# Patient Record
Sex: FEMALE | URBAN - METROPOLITAN AREA
[De-identification: names, ages, dates, MRNs, and addresses within clinical notes are randomized per-mention and may not be internally consistent; named-entity substitution may affect disease eponyms.]

---

## 2017-05-10 ENCOUNTER — IMPORTED ENCOUNTER (OUTPATIENT)
Dept: URBAN - METROPOLITAN AREA CLINIC 9 | Facility: CLINIC | Age: 67
End: 2017-05-10

## 2017-05-18 NOTE — PATIENT DISCUSSION
An examination that was significantly and separately identifiable from the procedure performed today was also completed for this retinal vein occlusion. No signs of neovascularization or worsening retinal ischemia, either or which unrecognized or untreated can be a significant source of significant vision loss, was seen today. There is no evidence of disease progression requiring any additional intervention for this condition today. Will continue to monitor closely.

## 2017-05-22 NOTE — PATIENT DISCUSSION
Recommended observation until #1 stable, edu can NOT make [facility] approve cat surgery here, that is up to the [facility].

## 2017-05-22 NOTE — PATIENT DISCUSSION
Rec Avastin injection x N/A.  This will require Auth. from the [facility] so will have to wait untill received to [address] [id number].

## 2017-05-22 NOTE — PATIENT DISCUSSION
Worse today, now Edema present. Beto Farooq was rec at last exam because was not active/No edema was present.  Still main reason for low VA is cataract but rec hold off till active CVO with ME is stable.

## 2017-06-14 NOTE — PATIENT DISCUSSION
Worse today, now Edema present. Todd Galarza was rec at last exam because was not active/No edema was present.  Still main reason for low VA is cataract but rec hold off till active CVO with ME is stable.

## 2017-06-14 NOTE — PROCEDURE NOTE: CLINICAL
PROCEDURE NOTE: Avastin () OD. Diagnosis: Central Retinal Vein Occlusion with Macular Edema. Prior to the original injection, risks/benefits/alternatives discussed including infection, loss of vision, hemorrhage, cataract, glaucoma, retinal tears or detachment. A written consent is on file, and the need for today’s injection was discussed and the patient is understanding and wishes to proceed. The off-label status of Intravitreal Avastin also was reviewed. The patient wished to proceed with treatment. The patient wished to proceed with treatment. Topical anesthetic drops were applied to the eye. Betadine prep was performed. Surgical mask worn. Sterile drape and lid speculum were applied. Using the syringe provided, Avastin 1.25 mg in 0.05 cc was injected into the vitreous cavity. Injection site: 3-4 mm from the limbus. Patient tolerated procedure well. Following the intravitreal injection, the sterile lid speculum was removed. CRA perfusion confirmed. CF vision checked. Patient given office phone number/answering service number and advised to call immediately should there be an increase in floaters or redness, loss of vision or pain, or should they have any other questions or concerns. Parmjit Rendon

## 2017-06-14 NOTE — PATIENT DISCUSSION
Recommended observation until #1 stable, edu can NOT make [facility] approve cat surgery here, that is up to the [facility].

## 2017-07-14 NOTE — PATIENT DISCUSSION
Recommended observation until #1 stable for at least a couple months, edu can NOT make [facility] approve cat surgery here, that is up to the [facility].

## 2017-09-22 NOTE — PATIENT DISCUSSION
Recommended observation until #1 stable for at least a couple months, edu can NOT make [facility] approve cat surgery here, that is up to the [facility].

## 2017-10-04 NOTE — PROCEDURE NOTE: CLINICAL
PROCEDURE NOTE: Avastin () #2 OD. Diagnosis: Central Retinal Vein Occlusion with Macular Edema. Prep: Betadine Drops and Betadine Scrub. Prior to the original injection, risks/benefits/alternatives discussed including infection, loss of vision, hemorrhage, cataract, glaucoma, retinal tears or detachment. A written consent is on file, and the need for today’s injection was discussed and the patient is understanding and wishes to proceed. The off-label status of Intravitreal Avastin also was reviewed. The patient wished to proceed with treatment. The patient wished to proceed with treatment. Topical anesthetic drops were applied to the eye. Betadine prep was performed. Surgical mask worn. Sterile drape and lid speculum were applied. Using the syringe provided, Avastin 1.25 mg in 0.05 cc was injected into the vitreous cavity. Injection site: 3-4 mm from the limbus. Patient tolerated procedure well. Following the intravitreal injection, the sterile lid speculum was removed. CRA perfusion confirmed. CF vision checked. Patient given office phone number/answering service number and advised to call immediately should there be an increase in floaters or redness, loss of vision or pain, or should they have any other questions or concerns. Bereket Cash

## 2017-10-04 NOTE — PATIENT DISCUSSION
The injection was given and tolerated well by patient. Post-injection instructions were reviewed and understood by the patient.

## 2017-10-04 NOTE — PATIENT DISCUSSION
Recommended observation until #1 stable for at least a couple months, edu can NOT make [facility] approve cat surgery here, that is up to the [facility].

## 2018-01-19 NOTE — PROCEDURE NOTE: CLINICAL
PROCEDURE NOTE: Eylea Sample #1 OD. Diagnosis: Central Retinal Vein Occlusion with Macular Edema. Anesthesia: Topical. Prep: Betadine Drops and Betadine Scrub. Prior to injection, risks/benefits/alternatives discussed including corneal abrasion, infection, loss of vision, hemorrhage, cataract, glaucoma, retinal tears or detachment. A written consent is on file, and the need for today’s injection was discussed and the patient is understanding and wishes to proceed. The entire vial of Eylea was drawn up into a syringe. The opened vial, remaining drug, and filtered needle were disposed of in a certified biohazard container. Betadine prep was performed. Topical anesthesia was induced with Alcaine. 4% lidocaine pledge. A lid speculum was used. A short 30g needle on a 1cc syringe was used with product that that had previously been prepared under sterile conditions. Injection site: 3-4 mm from the limbus. The used syringe/needle was transferred to a biohazard container. Lid speculum removed. Mask worn during procedure. Patient tolerated procedure well. Count fingers vision was verified. There were no complications. Patient was given the standard instruction sheet. Patient given office phone number/answering service number and advised to call immediately should there be loss of vision or pain, or should they have any other questions or concerns. Miah Sauer

## 2018-01-19 NOTE — PATIENT DISCUSSION
Increase edema seen on OCT and exam today.  Recommend Eylea injection today.  Will use a sample due to pt's insurance.

## 2018-01-19 NOTE — PATIENT DISCUSSION
Recommend pt have a vision check with DPM on 1/23/18 to make sure vision OD is better after injection for pt to proceed with surgery OS.

## 2018-01-19 NOTE — PATIENT DISCUSSION
Eylea #1 injection.  Injection was administered without complication.  Post-injection instructions were reviewed and understood by pt.

## 2018-01-29 NOTE — PATIENT DISCUSSION
CE\IOL schedules with [facility] 1/31/[address] Trip Trujillo.

## 2018-01-31 NOTE — PATIENT DISCUSSION
CE\IOL schedules with [facility] 1/31/[address] cleared [address] Pamela Montes.

## 2018-03-07 NOTE — PROCEDURE NOTE: CLINICAL
PROCEDURE NOTE: Eylea #2 OD. Diagnosis: Central Retinal Vein Occlusion with Macular Edema. Prior to injection, risks/benefits/alternatives discussed including corneal abrasion, infection, loss of vision, hemorrhage, cataract, glaucoma, retinal tears or detachment. A written consent is on file, and the need for today’s injection was discussed and the patient is understanding and wishes to proceed. The entire vial of Eylea was drawn up into a syringe. The opened vial, remaining drug, and filtered needle were disposed of in a certified biohazard container. Betadine prep was performed. Topical anesthesia was induced with Alcaine. 4% lidocaine pledge. A lid speculum was used. A short 30g needle on a 1cc syringe was used with product that that had previously been prepared under sterile conditions. Injection site: 3-4 mm from the limbus. The used syringe/needle was transferred to a biohazard container. Lid speculum removed. Mask worn during procedure. Patient tolerated procedure well. Count fingers vision was verified. There were no complications. Patient was given the standard instruction sheet. Patient given office phone number/answering service number and advised to call immediately should there be loss of vision or pain, or should they have any other questions or concerns. [facility]

## 2018-03-07 NOTE — PATIENT DISCUSSION
Pt edu edema worse. Only treatment is with another injection of Eylea. Will hopefully get some center vision back, may have to have multiple injections and may require future injections due to edema does have tendency to return. Pt elects to proceed with Eylea today.

## 2018-03-22 NOTE — PATIENT DISCUSSION
Eylea #2 injection.  Injection was administered without complication.  Post-injection instructions were reviewed and understood by pt. Will follow up in 4 weeks for re-evaluation. Advised pt to call with any other vision changes.

## 2018-04-05 NOTE — PATIENT DISCUSSION
Pt edu swelling slightly better but still persistent. Ozurdex vs Eylea discussed. [employer] of Ozurdex explained in detail. Pt elects to proceed. Will schedule Ozurdex in 2 weeks to allow time for auth from [address] Advised pt to call with any vision changes.

## 2018-04-18 NOTE — PROCEDURE NOTE: CLINICAL
PROCEDURE NOTE: Intravitreal Ozurdex #1 OD. Diagnosis: Central Retinal Vein Occlusion with Macular Edema. Anesthesia: Subconjunctival. Prep: Betadine Drops and Betadine Scrub. Prior to injection, risks/benefits/alternatives discussed including infection, loss of vision, hemorrhage, cataract, glaucoma, retinal tears or detachment and patient wished to proceed. After topical anesthesia, betadine prep was performed. After the Betadine prep, intravitreal injection of Ozurdex 0.7mg was given 3-4 mm from the limbus in the inferotemporal quadrant. A lid speculum was used. Intravitreal injection of Ozurdex 0.7mg was given. Injection site: 3-4 mm from the limbus. Lid speculum removed. Count fingers vision was verified. Patient tolerated procedure well. There were no complications. Count fingers or better vision was verified within 5 minutes of the intravitreal injection prior to the patient leaving the office. CRA perfusion confirmed. Post-op instructions given. Patient given office phone number/answering service number and advised to call immediately should there be an increase in floaters or redness, loss of vision or pain, or should they have any other questions or concerns. Dedra Ayala

## 2018-04-18 NOTE — PATIENT DISCUSSION
Ozurdex # 1 recommended today after discussion of benefits, risks and alternatives. The injection was given and tolerated well by patient. Post-injection instructions were reviewed and understood by the patient.

## 2018-07-20 NOTE — PATIENT DISCUSSION
Pt edu edema slightly worse but vision is improving. Would recommend Ozurdex # 2 in 4 weeks. Pt elects to proceed.

## 2018-08-08 NOTE — PATIENT DISCUSSION
Pt edu edema has worsened .  Recommend Ozurdex #2 today.  Pt elects to proceed.  Recommend Ozurdex every 3 months. The injection as administered without complication.  Post-injection instructions were reviewed and understood by pt. Pt advised to keep head elevated at night.

## 2018-08-08 NOTE — PROCEDURE NOTE: CLINICAL
PROCEDURE NOTE: Intravitreal Ozurdex #2 OD. Anesthesia: Subconjunctival. Prep: Betadine Drops and Betadine Scrub. Prior to injection, risks/benefits/alternatives discussed including infection, loss of vision, hemorrhage, cataract, glaucoma, retinal tears or detachment and patient wished to proceed. After topical anesthesia, betadine prep was performed. After the Betadine prep, intravitreal injection of Ozurdex 0.7mg was given 3-4 mm from the limbus in the inferotemporal quadrant. A lid speculum was used. Intravitreal injection of Ozurdex 0.7mg was given. Injection site: 3-4 mm from the limbus. Lid speculum removed. Count fingers vision was verified. Patient tolerated procedure well. There were no complications. Count fingers or better vision was verified within 5 minutes of the intravitreal injection prior to the patient leaving the office. CRA perfusion confirmed. Post-op instructions given. Patient given office phone number/answering service number and advised to call immediately should there be an increase in floaters or redness, loss of vision or pain, or should they have any other questions or concerns. Elo Vigil

## 2018-10-04 NOTE — PATIENT DISCUSSION
swelling improved good response to Ozurdex.  rec give Ozurdex more time to work and RTC in 2 month or sooner with changes.

## 2018-11-15 NOTE — PATIENT DISCUSSION
Swelling worse, rec RTC next week for Ozurdex inj.  Need to get auth from [facility].  Pt tolerated well in the past so okay to leave for out of town on 11/23/18.

## 2018-11-21 NOTE — PATIENT DISCUSSION
mild IOP elevation today prior to Ozurdex, dilation gtts altered.  Rec RTC in 3 weeks for IOP check with DPM and 2 month for DFE/OCT w/ JTM.  **Post Ozurdex IOP was 17**.

## 2018-11-21 NOTE — PATIENT DISCUSSION
Pt here for Ozurdex #3 today.  Keep head elevated x 24 hours, post injection instructions edu.  1 gtt Ilevro given Post inj due to pt c/o discomfort, stressed A.T. use as directed(Gel tears given).

## 2018-11-21 NOTE — PROCEDURE NOTE: CLINICAL
PROCEDURE NOTE: Intravitreal Ozurdex #3 OD. Diagnosis: Central Retinal Vein Occlusion with Macular Edema. Anesthesia: Subconjunctival. Prep: Betadine Drops and Betadine Scrub. Prior to injection, risks/benefits/alternatives discussed including infection, loss of vision, hemorrhage, cataract, glaucoma, retinal tears or detachment and patient wished to proceed. After topical anesthesia, betadine prep was performed. After the Betadine prep, intravitreal injection of Ozurdex 0.7mg was given 3-4 mm from the limbus in the inferotemporal quadrant. A lid speculum was used. Intravitreal injection of Ozurdex 0.7mg was given. Injection site: 3-4 mm from the limbus. Lid speculum removed. Count fingers vision was verified. Patient tolerated procedure well. There were no complications. Count fingers or better vision was verified within 5 minutes of the intravitreal injection prior to the patient leaving the office. CRA perfusion confirmed. Post-op instructions given. Patient given office phone number/answering service number and advised to call immediately should there be an increase in floaters or redness, loss of vision or pain, or should they have any other questions or concerns. Anthony Guillermo

## 2018-12-05 ENCOUNTER — IMPORTED ENCOUNTER (OUTPATIENT)
Dept: URBAN - METROPOLITAN AREA CLINIC 9 | Facility: CLINIC | Age: 68
End: 2018-12-05

## 2019-02-27 NOTE — PROCEDURE NOTE: CLINICAL
PROCEDURE NOTE: Intravitreal Ozurdex #4 OD. Diagnosis: Central Retinal Vein Occlusion with Macular Edema. Anesthesia: Subconjunctival. Prep: Betadine Drops and Betadine Scrub. Prior to injection, risks/benefits/alternatives discussed including infection, loss of vision, hemorrhage, cataract, glaucoma, retinal tears or detachment and patient wished to proceed. After topical anesthesia, betadine prep was performed. After the Betadine prep, intravitreal injection of Ozurdex 0.7mg was given 3-4 mm from the limbus in the inferotemporal quadrant. A lid speculum was used. Intravitreal injection of Ozurdex 0.7mg was given. Injection site: 3-4 mm from the limbus. Lid speculum removed. Count fingers vision was verified. Patient tolerated procedure well. There were no complications. Count fingers or better vision was verified within 5 minutes of the intravitreal injection prior to the patient leaving the office. CRA perfusion confirmed. Post-op instructions given. Patient given office phone number/answering service number and advised to call immediately should there be an increase in floaters or redness, loss of vision or pain, or should they have any other questions or concerns. [facility]#[id number] exp 08/2021.

## 2019-02-27 NOTE — PATIENT DISCUSSION
Pt here for Ozurdex #4 today.  RTC x 5 weeks due to hx OHTN.  keep head elevated x 24 hours, post inj instructions edu.

## 2019-04-05 NOTE — PATIENT DISCUSSION
Improved today, no fluid.  VA is much improved over the last year with treatments.  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  Goal of tx stressed.  At this time rec another Ozurdex in early June(1 week before [place] leaves [place] of the country) but possible role for future tx.

## 2019-04-29 NOTE — PATIENT DISCUSSION
Pt edu no swelling seen today on exam but difficult view due to #1. Ozurdex not recommended at this time. Recommend keeping next scheduled Ozurdex in June.

## 2019-04-29 NOTE — PATIENT DISCUSSION
Reason for decreased VA and pain. Dystrophy getting slightly worse. Recommend starting Pred Forte QID and Kin Ointment PM. Recommend following up with [facility].

## 2019-05-06 NOTE — PATIENT DISCUSSION
Continue Pred Forte QID until surgery, after surgery every 2 hours x 1 week, every 3 hours x 1 week, 4x a day x 1 week, 3x a day x 1 week, 2x a day x 1 week, 1x a day x 1 week. Use Kin Ointment PM until surgery date and then stop after surgery. Start Ketorolac after surgery-1x a day x 2 weeks then stop. Start Vigamox after surgery 4x a day x 7 days then stop.

## 2019-05-06 NOTE — PATIENT DISCUSSION
I recommend DSAEK OD. The risks to surgery were discussed, including graft dislocation requiring a second operation to reposition the corneal button, infection, graft rejection, and glaucoma,  The patient elects to proceed with surgery. We discussed the need to lay flat for 24 hours after surgery in order to optimize corneal graft position.

## 2019-05-23 NOTE — PATIENT DISCUSSION
Reduce Prednisolone to qid OD; Stop Vigamox; cont Ketorolac qid(cold); Add preservative-free Refresh qid.

## 2019-05-28 NOTE — PROCEDURE NOTE: CLINICAL
PROCEDURE NOTE: Suture Removal #1 OD. Prior to treatment, the risks/benefits/alternatives were discussed. The patient wished to proceed with procedure. The suture(s) were cut with the bevel of a 25 gauge needle, then removed with a jeweler’s forceps. Patient tolerated procedure well. Topical antibiotic was given pre and post suture removal. There were no complications. Post procedure instructions given. Rebecca Chavez

## 2019-06-05 NOTE — PATIENT DISCUSSION
Due to recent DSAEK and pt experiencing some mild pain, Ozurdex not recommended today. Will r/s for next week for a full exam. If pt healing well and has less symptoms will proceed with injection.

## 2019-06-05 NOTE — PATIENT DISCUSSION
Pt states a bandage contact lens was removed yesterday but DPM and experienced pain in the afternoon and night. Edu pt DSAEK well centered and healing well. Normal to have some pain following surgery. Recommend pt keep PO follow up with DPM in July. Continue all drops as directed.

## 2019-06-12 NOTE — PROCEDURE NOTE: CLINICAL
PROCEDURE NOTE: Intravitreal Ozurdex #5 OD. Diagnosis: Central Retinal Vein Occlusion with Macular Edema. Anesthesia: Subconjunctival. Prep: Betadine Drops and Betadine Scrub. Prior to injection, risks/benefits/alternatives discussed including infection, loss of vision, hemorrhage, cataract, glaucoma, retinal tears or detachment and patient wished to proceed. After topical anesthesia, betadine prep was performed. After the Betadine prep, intravitreal injection of Ozurdex 0.7mg was given 3-4 mm from the limbus in the inferotemporal quadrant. A lid speculum was used. Intravitreal injection of Ozurdex 0.7mg was given. Injection site: 3-4 mm from the limbus. Lid speculum removed. Count fingers vision was verified. Patient tolerated procedure well. There were no complications. Count fingers or better vision was verified within 5 minutes of the intravitreal injection prior to the patient leaving the office. CRA perfusion confirmed. Post-op instructions given. Patient given office phone number/answering service number and advised to call immediately should there be an increase in floaters or redness, loss of vision or pain, or should they have any other questions or concerns. [facility]#[id number] exp 12/2021.

## 2019-06-12 NOTE — PATIENT DISCUSSION
Pt's surface of eye irritation healing post DSEAK, rec proceed with Ozurdex #5 today.  Macular edema still present from CVO, goal of tx edu.  Keep head elevated x 24 hours.  Post inj instructions edu.

## 2019-09-12 NOTE — PATIENT DISCUSSION
Pt edu edema worse but vision holding. Did well with last treatment so would recommend Ozurdex #6 in 2 weeks. Pt elects to proceed.

## 2019-09-25 NOTE — PROCEDURE NOTE: CLINICAL
PROCEDURE NOTE: A/C Paracentesis(post) #1 OD. Diagnosis: Ocular Hypertension. Prior to treatment, the risks/benefits/alternatives were discussed. The patient wished to proceed with procedure. Location of Tap: Temporal Limbus. The eye was prepped with topical Betadine 5%, a sterile lid speculum was placed in the eye. Volume of tap: 0.1 ml. Patient tolerated procedure well. There were no complications. Post procedure instructions given. Patient given office phone number/answering service number and advised to call immediately should there be an increase in floaters or redness, loss of vision or pain, or should they have any other questions or concerns. Kayley Bernardo PROCEDURE NOTE: Lucentis 0.5 mg**SAMPLE** #1 OD. Diagnosis: Central Retinal Vein Occlusion with Macular Edema. Prior to injection, risks/benefits/alternatives discussed including infection, loss of vision, hemorrhage, cataract, glaucoma, retinal tears or detachment and patient wished to proceed. Informed consent obtained. . Patient was advised the purpose of the treatment was to slow the progression of the disease, and may not improve visual acuity. Betadine prep was performed. Injection site: 3-4 mm from the limbus. Mask worn during procedure. A lid speculum was used. Intravitreal injection of Lucentis 0.5mg/0.05 ml was given. Discarded remaining *. CRA perfusion confirmed. The eye was irrigated with sterile eye rinse solution. The betadine was washed away. Count fingers vision was verified. The patient tolerated the procedure well and there were no complications from the procedure. Post procedure instructions given. Patient given office phone number/answering service number and advised to call immediately should there be an increase in floaters or redness, loss of vision or pain, or should they have any other questions or concerns. Patient was given the standard instruction sheet. Kayley Bernardo

## 2019-09-25 NOTE — PATIENT DISCUSSION
IOP too high today, rec cont all drops and post inj paracentisis. ***due to increased IOP do NOT rec proceed with Ozurdex(steroid) Rec use Lucentis 0.5mg SAMPLE MED today.  Consider future ozurdex once IOP is controlled.

## 2019-09-25 NOTE — PATIENT DISCUSSION
IOP too high today, rec cont all drops and post inj paracentisis. ***due to increased IOP do NOT rec proceed with Ozurdex(steroid) Rec use Lucentis 0.5mg SAMPLE MED today.  Consider future ozurdex once IOP is controlled.  Lucentis(sample) #1 today.  Post inj instructions edu.

## 2019-11-08 NOTE — PATIENT DISCUSSION
Pt edu no edema seen on OCT or exam today.  Recommended Avastin in 2 weeks.  Series of 2 x 7 weeks apart due to holidays.  Consider future Ozurdex once IOP is more controlled.

## 2019-11-21 NOTE — PROCEDURE NOTE: CLINICAL
PROCEDURE NOTE: Avastin () (1 of 2) #3 OD. Diagnosis: Central Retinal Vein Occlusion with Macular Edema. Prep: Betadine Drops and Betadine Scrub. Prior to the original injection, risks/benefits/alternatives discussed including infection, loss of vision, hemorrhage, cataract, glaucoma, retinal tears or detachment. A written consent is on file, and the need for today’s injection was discussed and the patient is understanding and wishes to proceed. The off-label status of Intravitreal Avastin also was reviewed. The patient wished to proceed with treatment. The patient wished to proceed with treatment. Topical anesthetic drops were applied to the eye. Betadine prep was performed. Surgical mask worn. Sterile drape and lid speculum were applied. Using the syringe provided, Avastin 1.25 mg in 0.05 cc was injected into the vitreous cavity. Injection site: 3-4 mm from the limbus. Patient tolerated procedure well. Following the intravitreal injection, the sterile lid speculum was removed. CRA perfusion confirmed. CF vision checked. Patient given office phone number/answering service number and advised to call immediately should there be an increase in floaters or redness, loss of vision or pain, or should they have any other questions or concerns. Rogerio Gutierrez

## 2020-01-08 NOTE — PATIENT DISCUSSION
1. Caller Name: Yadira Suarez                        Call Back Number: [phone] (home)       2. Message: Pt was seen in Dec and c/o twitch in L eye. Now it is affecting the whole L side of her face. Would like you to order an MRI. Please send to [facility] in [place], [phone]. Can do it on [facility].     3. Patient approves office to leave a detailed voicemail/MyChart message: no       Bandage contact lens removed.

## 2020-01-08 NOTE — PROCEDURE NOTE: CLINICAL
PROCEDURE NOTE: Avastin () (2 of 2) #4 OD. Diagnosis: Central Retinal Vein Occlusion with Macular Edema. Prep: Betadine Drops and Betadine Scrub. Prior to the original injection, risks/benefits/alternatives discussed including infection, loss of vision, hemorrhage, cataract, glaucoma, retinal tears or detachment. A written consent is on file, and the need for today’s injection was discussed and the patient is understanding and wishes to proceed. The off-label status of Intravitreal Avastin also was reviewed. The patient wished to proceed with treatment. The patient wished to proceed with treatment. Topical anesthetic drops were applied to the eye. Betadine prep was performed. Surgical mask worn. Sterile drape and lid speculum were applied. Using the syringe provided, Avastin 1.25 mg in 0.05 cc was injected into the vitreous cavity. Injection site: 3-4 mm from the limbus. Patient tolerated procedure well. Following the intravitreal injection, the sterile lid speculum was removed. CRA perfusion confirmed. CF vision checked. Patient given office phone number/answering service number and advised to call immediately should there be an increase in floaters or redness, loss of vision or pain, or should they have any other questions or concerns. Bereket Cash

## 2020-03-04 NOTE — PROCEDURE NOTE: CLINICAL
PROCEDURE NOTE: Avastin ()(1 of 2) #5 OD. Diagnosis: Central Retinal Vein Occlusion with Macular Edema. Prep: Betadine Drops and Betadine Scrub. Prior to the original injection, risks/benefits/alternatives discussed including infection, loss of vision, hemorrhage, cataract, glaucoma, retinal tears or detachment. A written consent is on file, and the need for today’s injection was discussed and the patient is understanding and wishes to proceed. The off-label status of Intravitreal Avastin also was reviewed. The patient wished to proceed with treatment. The patient wished to proceed with treatment. Topical anesthetic drops were applied to the eye. Betadine prep was performed. Surgical mask worn. Sterile drape and lid speculum were applied. Using the syringe provided, Avastin 1.25 mg in 0.05 cc was injected into the vitreous cavity. Injection site: 3-4 mm from the limbus. Patient tolerated procedure well. Following the intravitreal injection, the sterile lid speculum was removed. CRA perfusion confirmed. CF vision checked. Patient given office phone number/answering service number and advised to call immediately should there be an increase in floaters or redness, loss of vision or pain, or should they have any other questions or concerns. Lot #[id number], exp 5/07/2020.

## 2020-03-04 NOTE — PATIENT DISCUSSION
Pt edu edema seen today on OCT and exam.  Recommended Avastin #5 injection today.  Pt elects to proceed.  The injection was given and tolerated well by patient. Post-injection instructions were reviewed and understood by the patient.  Series of 2 x 6 weeks apart.

## 2020-04-15 NOTE — PROCEDURE NOTE: CLINICAL
PROCEDURE NOTE: Avastin () (2 of 2) #6 OD. Diagnosis: Central Retinal Vein Occlusion with Macular Edema. Prep: Betadine Drops and Betadine Scrub. Prior to the original injection, risks/benefits/alternatives discussed including infection, loss of vision, hemorrhage, cataract, glaucoma, retinal tears or detachment. A written consent is on file, and the need for today’s injection was discussed and the patient is understanding and wishes to proceed. The off-label status of Intravitreal Avastin also was reviewed. The patient wished to proceed with treatment. The patient wished to proceed with treatment. Topical anesthetic drops were applied to the eye. Betadine prep was performed. Surgical mask worn. Sterile drape and lid speculum were applied. Using the syringe provided, Avastin 1.25 mg in 0.05 cc was injected into the vitreous cavity. Injection site: 3-4 mm from the limbus. Patient tolerated procedure well. Following the intravitreal injection, the sterile lid speculum was removed. CRA perfusion confirmed. CF vision checked. Patient given office phone number/answering service number and advised to call immediately should there be an increase in floaters or redness, loss of vision or pain, or should they have any other questions or concerns. Vivi Bartletter

## 2020-04-15 NOTE — PATIENT DISCUSSION
Pt her for Avastin #6(2 of 2) injection today.  Pt elects to proceed.  The injection was given and tolerated well by patient. Post-injection instructions were reviewed and understood by the patient.

## 2020-06-03 NOTE — PROCEDURE NOTE: CLINICAL
PROCEDURE NOTE: Avastin ()(1 of 3) #7 OD. Diagnosis: Central Retinal Vein Occlusion with Macular Edema. Prep: Betadine Drops and Betadine Scrub. Prior to the original injection, risks/benefits/alternatives discussed including infection, loss of vision, hemorrhage, cataract, glaucoma, retinal tears or detachment. A written consent is on file, and the need for today’s injection was discussed and the patient is understanding and wishes to proceed. The off-label status of Intravitreal Avastin also was reviewed. The patient wished to proceed with treatment. The patient wished to proceed with treatment. Topical anesthetic drops were applied to the eye. Betadine prep was performed. Surgical mask worn. Sterile drape and lid speculum were applied. Using the syringe provided, Avastin 1.25 mg in 0.05 cc was injected into the vitreous cavity. Injection site: 3-4 mm from the limbus. Patient tolerated procedure well. Following the intravitreal injection, the sterile lid speculum was removed. CRA perfusion confirmed. CF vision checked. Patient given office phone number/answering service number and advised to call immediately should there be an increase in floaters or redness, loss of vision or pain, or should they have any other questions or concerns. Chanda Castro

## 2020-06-03 NOTE — PATIENT DISCUSSION
Still edema preasent, rec series of 3 Avastin x 6 weeks apart.  Goal of tx edu.   Avastin #6(1 of 3) injection today.  Pt elects to proceed.  The injection was given and tolerated well by patient. Post-injection instructions were reviewed and understood by the patient.

## 2020-07-15 NOTE — PROCEDURE NOTE: CLINICAL
PROCEDURE NOTE: Avastin () (2 of 3) #8 OD. Diagnosis: Central Retinal Vein Occlusion with Macular Edema. Prep: Betadine Drops and Betadine Scrub. Prior to the original injection, risks/benefits/alternatives discussed including infection, loss of vision, hemorrhage, cataract, glaucoma, retinal tears or detachment. A written consent is on file, and the need for today’s injection was discussed and the patient is understanding and wishes to proceed. The off-label status of Intravitreal Avastin also was reviewed. The patient wished to proceed with treatment. The patient wished to proceed with treatment. Topical anesthetic drops were applied to the eye. Betadine prep was performed. Surgical mask worn. Sterile drape and lid speculum were applied. Using the syringe provided, Avastin 1.25 mg in 0.05 cc was injected into the vitreous cavity. Injection site: 3-4 mm from the limbus. Patient tolerated procedure well. Following the intravitreal injection, the sterile lid speculum was removed. CRA perfusion confirmed. CF vision checked. Patient given office phone number/answering service number and advised to call immediately should there be an increase in floaters or redness, loss of vision or pain, or should they have any other questions or concerns. Dedra Ayala

## 2020-07-15 NOTE — PATIENT DISCUSSION
Avastin #8 (2 of 3) recommended today after discussion of benefits, risks and alternatives. The injection was given and tolerated well by patient. Post-injection instructions were reviewed and understood by the patient.

## 2020-08-26 NOTE — PROCEDURE NOTE: CLINICAL
PROCEDURE NOTE: Avastin () (3 of 3) #9 OD. Diagnosis: Central Retinal Vein Occlusion with Macular Edema. Prior to the original injection, risks/benefits/alternatives discussed including infection, loss of vision, hemorrhage, cataract, glaucoma, retinal tears or detachment. A written consent is on file, and the need for today’s injection was discussed and the patient is understanding and wishes to proceed. The off-label status of Intravitreal Avastin also was reviewed. The patient wished to proceed with treatment. The patient wished to proceed with treatment. Topical anesthetic drops were applied to the eye. Betadine prep was performed. Surgical mask worn. Sterile drape and lid speculum were applied. Using the syringe provided, Avastin 1.25 mg in 0.05 cc was injected into the vitreous cavity. Injection site: 3-4 mm from the limbus. Patient tolerated procedure well. Following the intravitreal injection, the sterile lid speculum was removed. CRA perfusion confirmed. CF vision checked. Patient given office phone number/answering service number and advised to call immediately should there be an increase in floaters or redness, loss of vision or pain, or should they have any other questions or concerns. Luly Thompson

## 2020-08-26 NOTE — PATIENT DISCUSSION
Avastin #9 (3 of 3) recommended today after discussion of benefits, risks and alternatives. The injection was given and tolerated well by patient. Post-injection instructions were reviewed and understood by the patient.

## 2020-09-25 NOTE — PATIENT DISCUSSION
Pt c/o decreased near vision and requesting new glasses.  Pt advised clear for refraction at the [address] [place] [address]

## 2020-09-25 NOTE — PATIENT DISCUSSION
good response to tx, can start to extend out tx to Q 6 weeks unless new activity occurs.  Rec series o f2 Avastin x 6 weeks apart(start in 2 weeks).  Goal of tx edu, pt clear from a retina standpoint for refraction at the [facility].

## 2020-10-07 NOTE — PROCEDURE NOTE: CLINICAL
PROCEDURE NOTE: Avastin () (1 of 2) #10 OD. Diagnosis: Central Retinal Vein Occlusion with Macular Edema. Prior to the original injection, risks/benefits/alternatives discussed including infection, loss of vision, hemorrhage, cataract, glaucoma, retinal tears or detachment. A written consent is on file, and the need for today’s injection was discussed and the patient is understanding and wishes to proceed. The off-label status of Intravitreal Avastin also was reviewed. The patient wished to proceed with treatment. The patient wished to proceed with treatment. Topical anesthetic drops were applied to the eye. Betadine prep was performed. Surgical mask worn. Sterile drape and lid speculum were applied. Using the syringe provided, Avastin 1.25 mg in 0.05 cc was injected into the vitreous cavity. Injection site: 3-4 mm from the limbus. Patient tolerated procedure well. Following the intravitreal injection, the sterile lid speculum was removed. CRA perfusion confirmed. CF vision checked. Patient given office phone number/answering service number and advised to call immediately should there be an increase in floaters or redness, loss of vision or pain, or should they have any other questions or concerns. Ivis Min PROCEDURE NOTE: A/C Paracentesis #1 OD. Diagnosis: Ocular Hypertension. Prior to treatment, the risks/benefits/alternatives were discussed. The patient wished to proceed with procedure. Location of Tap: Temporal Limbus. The eye was prepped with topical Betadine 5%, a sterile lid speculum was placed in the eye. Volume of tap: 0.1 ml. Patient tolerated procedure well. There were no complications. Post procedure instructions given. Patient given office phone number/answering service number and advised to call immediately should there be an increase in floaters or redness, loss of vision or pain, or should they have any other questions or concerns. Ivis Min

## 2020-11-18 NOTE — PROCEDURE NOTE: CLINICAL
PROCEDURE NOTE: Avastin () (2 of 2) #11 OD. Diagnosis: Central Retinal Vein Occlusion with Macular Edema. Prep: Betadine Drops and Betadine Scrub. Prior to the original injection, risks/benefits/alternatives discussed including infection, loss of vision, hemorrhage, cataract, glaucoma, retinal tears or detachment. A written consent is on file, and the need for today’s injection was discussed and the patient is understanding and wishes to proceed. The off-label status of Intravitreal Avastin also was reviewed. The patient wished to proceed with treatment. The patient wished to proceed with treatment. Topical anesthetic drops were applied to the eye. Betadine prep was performed. Surgical mask worn. Sterile drape and lid speculum were applied. Using the syringe provided, Avastin 1.25 mg in 0.05 cc was injected into the vitreous cavity. Injection site: 3-4 mm from the limbus. Patient tolerated procedure well. Following the intravitreal injection, the sterile lid speculum was removed. CRA perfusion confirmed. CF vision checked. Patient given office phone number/answering service number and advised to call immediately should there be an increase in floaters or redness, loss of vision or pain, or should they have any other questions or concerns. Vivi Canter PROCEDURE NOTE: A/C Paracentesis (Post injection) #2 OD. Diagnosis: Ocular Hypertension. Prior to treatment, the risks/benefits/alternatives were discussed. The patient wished to proceed with procedure. Location of Tap: Temporal Limbus. The eye was prepped with topical Betadine 5%, a sterile lid speculum was placed in the eye. Volume of tap: 0.1 ml. Patient tolerated procedure well. There were no complications. Post procedure instructions given. Patient given office phone number/answering service number and advised to call immediately should there be an increase in floaters or redness, loss of vision or pain, or should they have any other questions or concerns. Vivi Canter

## 2021-01-04 NOTE — PATIENT DISCUSSION
No Neovascularization Present today. Refer to [address] for Glaucoma Consult for [address] vs Tube.

## 2021-01-04 NOTE — PATIENT DISCUSSION
Avastin #11 (2 of 2) recommended today after discussion of benefits, risks and alternatives. The injection was given and tolerated well by patient. Post-injection instructions were reviewed and understood by the patient.

## 2021-01-07 NOTE — PATIENT DISCUSSION
per [facility] TREATMENT [place] d/c Pred could be contributing to high IOP.

## 2021-01-07 NOTE — PATIENT DISCUSSION
Increased edema, worse. Recommend decreasing the interval between injections, series of 2 Avastin, 5 weeks apart. Pt elects to proceed.

## 2021-01-07 NOTE — PATIENT DISCUSSION
Avastin #12 (1 of 2) recommended today after discussion of benefits, risks and alternatives. The injection was given and tolerated well by patient. Post-injection instructions were reviewed and understood by the patient. Visit Information Dalila Pinto Personal Médico Departamento Teléfono del Dep. Número de visita 5/18/2017 10:30 AM Griselda Rogers NP [address] [phone] Follow-up Instructions Return in about 2 weeks (around 6/1/2017). Your Appointments 5/30/2017  1:00 PM  
Follow Up with Mey Souza MD  
[facility]  ([facility]) Appt Note: Follow up (2 weeks) per SO  
 [address] [employer] [address]  
[phone]  
  
   
 [address] [facility] [employer] [address] Upcoming Health Maintenance Date Due  
 HPV AGE 9Y-34Y (1 of 3 - Female 3 Dose Series) 6/22/2017 MCV through Age 25 (1 of 2) 6/22/2017 DTaP/Tdap/Td series (6 - Tdap) 6/22/2017 INFLUENZA AGE 9 TO ADULT 8/1/2017 Alergias  Review Complete El: 5/18/2017 Por: Griselda Rogers NP  
 Michae Solid del:  5/18/2017 No Known Allergies Vacunas actuales Revisadas el:  5/18/2017 Iver Cobia DTaP 7/6/2010, 12/26/2007, 1/31/2007, 2006, 2006 Hep A Vaccine 3/31/2008, 9/24/2007 Hep B Vaccine 4/2/2007, 2006, 2006 Hib 9/24/2007, 1/31/2007, 2006, 2006 Influenza Vaccine 10/5/2009 MMR 7/6/2010, 9/24/2007 Pneumococcal Vaccine (Unspecified Type) 9/24/2007, 1/31/2007, 2006, 2006 Poliovirus vaccine 7/6/2010, 4/2/2007, 2006, 2006 Varicella Virus Vaccine 7/6/2010, 12/26/2007 [id number] por:  Merly Vasquez RN  [id number] el:  5/18/2017 11:11 AM  
  
You Were Diagnosed With   
  
 Códigos Comentarios Wound abscess, initial encounter    -  Primary ICD-10-CM: T81. 4XXA ICD-9-CM: 879.9 Partes vitales PS Pulso Temperatura [place] ( percentil de crecimiento)  
 137/77 (>99 %/ 89 %)* (BP 1 Location: Right arm, BP Patient Position: Sitting) 83 97.3 °F (36.3 °C) (Oral) (!) 5' 1.93\" (1.573 m) (97 %, Z= 1.90) Peso (percentil de crecimiento) BMI [facility]) Estado obstétrico Estatus de tabaquísmo 146 lb 3.2 oz (66.3 kg) (>99 %, Z= 2.34) 26.8 kg/m2 (98 %, Z= 2.00) Premenarcheal Never Assessed *BP percentiles are based on NHBPEP's 4th Report Growth percentiles are based on CDC 2-20 Years data. Historial de signos vitales BMI and BSA Data Body Mass Index Body Surface Area  
 26.8 kg/m 2 1.7 m 2 [facility] Name Phone [facility] [address] [facility] [phone] [place] lista de medicamentos actualizada Lista actualizada el: 5/18/17  1:51 PM.  Michael Aparicioemann use hutchinson lista de medicamentos más reciente. cephALEXin 500 mg capsule También conocido angi:  Deward Curly Take 1 Cap by mouth three (3) times daily for 10 days. SALINE NOSE 0.65 % nasal spray Medicamento genérico:  sodium chloride 1 [place] by Both Nostrils route as needed for Congestion. Recetas Enviado a la [place] Refills  
 cephALEXin (KEFLEX) 500 mg capsule 0 Sig: Take 1 Cap by mouth three (3) times daily for 10 days. Class: Normal  
 Pharmacy: [facility] [address] [facility] #: [phone] Route: Oral  
  
Instrucciones de seguimiento Return in about 2 weeks (around 6/1/2017). Instrucciones para el Paciente Un merary de sitz (Sitz bath en ingles) por 20-30 minutes Absceso cutáneo en niños: Instrucciones de cuidado - [ Skin Abscess in Children: Care Instructions ] Instrucciones de cuidado Un absceso en la piel es tarik infección bacteriana que forma tarik bolsa de pus. Un forúnculo es tarik especie de absceso de la piel. Puede que el médico haya hecho tarik incisión en el absceso para que pueda drenar el pus. Antonio vez hutchinson hijo tenga gasa en la incisión para que el absceso se mantenga abierto y siga drenando. [place] hijo puede necesitar antibióticos.  Chace Mejía hacer un seguimiento con hutchinson médico para asegurarse de que la infección haya desaparecido. El médico costa examinado minuciosamente a hutchinson hijo, esteban pueden presentarse problemas más tarde. Si nota algún problema o nuevos síntomas, busque tratamiento médico de inmediato. La atención de seguimiento es tarik parte clave del tratamiento y la seguridad de hutchinson hijo. Asegúrese de hacer y acudir a todas las citas, y llame a hutchinson médico si hutchinson hijo está teniendo problemas. También es tarik buena idea saber los resultados de los exámenes de hutchinson hijo y mantener tarik lista de los medicamentos que sidney. Cómo puede cuidar a hutchinson hijo en el hogar? · Aplíquele compresas tibias y secas con tarik bolsa de agua tibia 3 o 4 veces al día para el dolor. Mantenga un paño entre la bolsa de agua tibia y la piel de hutchinson hijo. · Si el médico le recetó antibióticos a hutchinson hijo, déselos según las indicaciones. No deje de dárselos solo porque hutchinson hijo se sienta mejor. [place] hijo debe wallace todos los antibióticos [employer]. · Sea johnathan con los medicamentos. Nicholas los analgésicos (medicamentos para el dolor) exactamente según las indicaciones. ¨ Si el médico le recetó un analgésico a hutchinson hijo, déselo según las indicaciones. ¨ Si hutchinson hijo no está tomando un analgésico recetado, pregúntele a hutchinson médico si hutchinson hijo puede wallace un medicamento de Jazmine Galea. · Mantenga la venda de hutchinson Arlene Keel y [place]. Cambie la venda cuando se moje o se ensucie, o por lo menos tarik vez al día. · Si el absceso se taponó con gasa: 
¨ Cumpla con las citas de seguimiento para que le cambien o le quiten la gasa. Si el médico le indicó que se quitara la gasa, retire toda la gasa suavemente cuando el médico le diga que lo matt. ¨ Después de quitar la gasa, empape la rosanna con agua tibia de 15 a 20 minutos 2 veces al día, hasta que la herida se cierre. Cuándo debe pedir ayuda?  
Llame a hutchinson médico ahora mismo o busque atención médica inmediata si: 
 · [place] hijo tiene señales de que la infección está empeorando, tales angi: ¨ Aumento del dolor, la hinchazón, la temperatura o el enrojecimiento. ¨ Vetas rojizas que salen de la piel infectada. ¨ Pus que supura de la herida. Ulyess Course. Vigile muy de cerca los cambios en la zahraa de hutchinson hijo, y asegúrese de comunicarse con hutchinson médico si: 
· [place] hijo no mejora angi se esperaba. Dónde puede encontrar más información en inglés? Denia Omar a [email or web address].[email or web address] Bryant Puff U897 en la búsqueda para aprender más acerca de \"Absceso cutáneo en niños: Instrucciones de cuidado - [ Skin Abscess in Children: Care Instructions ]. \" 
Revisado: 13 octubre, 2016 Versión del contenido: 11.2 © 4002-3137 Healthwise, Incorporated. Las instrucciones de cuidado fueron adaptadas bajo licencia por [employer] (which disclaims liability or warranty for this information). Si usted tiene [place] afección médica o sobre estas instrucciones, siempre pregunte a hutchinson profesional de zahraa. Healthwise, Incorporated niega toda garantía o responsabilidad por hutchinson uso de esta información. Introducing [facility] SERVICES! Estimado padre o  , 
[place] por solicitar tarik cuenta de MyChart para hutchinson hijo . Con MyChart , puede parker hospitalarios o de descarga ER instrucciones de hutchinson hijo , alergias , vacunas actuales y [facility] . Con el fin de acceder a la información de hutchinson hijo , se requiere un consentimiento firmado el archivo. Por favor, consulte el departamento [facility] o llame [phone] para obtener instrucciones sobre cómo completar tarik solicitud MyChart Proxy . Información Adicional 
 
Si tiene alguna pregunta , por favor visite la sección de preguntas frecuentes del sitio web MyChart en [email or web address] . Recuerde, MyChart NO es que se utilizará para las necesidades urgentes. Para emergencias médicas , llame al 911 . Ahora disponible en hutchinson iPhone y Android ! Por favor proporcione kenyon resumen de la documentación de cuidado a hutchinson próximo proveedor. If you have any questions after today's visit, please call [phone].

## 2021-01-07 NOTE — PROCEDURE NOTE: CLINICAL
PROCEDURE NOTE: Avastin () (1 of 2) #12 OD. Diagnosis: Central Retinal Vein Occlusion with Macular Edema. Anesthesia: Proparacaine 0.5%. Prep: Betadine Drops and Betadine Scrub. Prior to the original injection, risks/benefits/alternatives discussed including infection, loss of vision, hemorrhage, cataract, glaucoma, retinal tears or detachment. A written consent is on file, and the need for today’s injection was discussed and the patient is understanding and wishes to proceed. The off-label status of Intravitreal Avastin also was reviewed. The patient wished to proceed with treatment. The patient wished to proceed with treatment. Topical anesthetic drops were applied to the eye. Betadine prep was performed. Surgical mask worn. Sterile drape and lid speculum were applied. Using the syringe provided, Avastin 1.25 mg in 0.05 cc was injected into the vitreous cavity. Injection site: 3-4 mm from the limbus. Patient tolerated procedure well. Following the intravitreal injection, the sterile lid speculum was removed. CRA perfusion confirmed. CF vision checked. Patient given office phone number/answering service number and advised to call immediately should there be an increase in floaters or redness, loss of vision or pain, or should they have any other questions or concerns. Milana Juarez

## 2021-01-07 NOTE — PATIENT DISCUSSION
No Neovascularization Present today. Refer to [address] for Glaucoma Consult for [address] vs Tube.

## 2021-02-09 NOTE — PATIENT DISCUSSION
IOP BETTER TODAY. ? STEROID RESPONSE. DECREASE BRIMONIDINE AND DORZOLAMIDE/TIMOLOL BID OD. IF IOP ELEVATED NEXT VISIT, THEN T/C CPC OD.

## 2021-02-17 NOTE — PROCEDURE NOTE: CLINICAL
PROCEDURE NOTE: Avastin ( (2 of 2) #13 OD. Diagnosis: Central Retinal Vein Occlusion with Macular Edema. Anesthesia: Proparacaine 0.5%. Prep: Betadine Drops and Betadine Scrub. Prior to the original injection, risks/benefits/alternatives discussed including infection, loss of vision, hemorrhage, cataract, glaucoma, retinal tears or detachment. A written consent is on file, and the need for today’s injection was discussed and the patient is understanding and wishes to proceed. The off-label status of Intravitreal Avastin also was reviewed. The patient wished to proceed with treatment. The patient wished to proceed with treatment. Topical anesthetic drops were applied to the eye. Betadine prep was performed. Surgical mask worn. Sterile drape and lid speculum were applied. Using the syringe provided, Avastin 1.25 mg in 0.05 cc was injected into the vitreous cavity. Injection site: 3-4 mm from the limbus. Patient tolerated procedure well. Following the intravitreal injection, the sterile lid speculum was removed. CRA perfusion confirmed. CF vision checked. Patient given office phone number/answering service number and advised to call immediately should there be an increase in floaters or redness, loss of vision or pain, or should they have any other questions or concerns. lot #[id number], exp 4/19/21.

## 2021-02-17 NOTE — PATIENT DISCUSSION
per [facility] TREATMENT [place] d/c Pred could be contributing to high IOP.

## 2021-02-17 NOTE — PATIENT DISCUSSION
No Neovascularization Present today. Refer to [facility] for Glaucoma Consult for [address] vs Tube.

## 2021-02-17 NOTE — PATIENT DISCUSSION
Pt here today for Avastin #13 (2 of 2) injection.   The injection was given and tolerated well by pt. Post-injection instructions were reviewed and understood by the patient.

## 2021-02-22 NOTE — PATIENT DISCUSSION
Transfer to NH; Follow up by an assigned physician, Diet: cardiac; Meds: see list; Activity: as tolerated.     continue vigamox gtt tid OD for 2 days then DC.

## 2021-03-09 NOTE — PATIENT DISCUSSION
IOP BETTER TODAY.   ? PREVIOUS STEROID RESPONSE WHILE ON PRED.  PT NOW OFF.  [facility]IN[facility] CURRENT MANAG[facility]

## 2021-03-25 NOTE — PROCEDURE NOTE: CLINICAL
PROCEDURE NOTE: Avastin () #14 OD. Diagnosis: Central Retinal Vein Occlusion with Macular Edema. Anesthesia: Proparacaine 0.5%. Prep: Betadine Drops and Betadine Scrub. Prior to the original injection, risks/benefits/alternatives discussed including infection, loss of vision, hemorrhage, cataract, glaucoma, retinal tears or detachment. A written consent is on file, and the need for today’s injection was discussed and the patient is understanding and wishes to proceed. The off-label status of Intravitreal Avastin also was reviewed. The patient wished to proceed with treatment. The patient wished to proceed with treatment. Topical anesthetic drops were applied to the eye. Betadine prep was performed. Surgical mask worn. Sterile drape and lid speculum were applied. Using the syringe provided, Avastin 1.25 mg in 0.05 cc was injected into the vitreous cavity. Injection site: 3-4 mm from the limbus. Patient tolerated procedure well. Following the intravitreal injection, the sterile lid speculum was removed. CRA perfusion confirmed. CF vision checked. Patient given office phone number/answering service number and advised to call immediately should there be an increase in floaters or redness, loss of vision or pain, or should they have any other questions or concerns. Addie Dorman

## 2021-03-25 NOTE — PATIENT DISCUSSION
No Neovascularization Present today. Refer to [facility] for Glaucoma Consult for [address] vs Tube.

## 2021-03-25 NOTE — PATIENT DISCUSSION
Clinical exam and/or retinal imaging show an increase in macular thickness consistent with worsening and/or developing macular edema. Recommend Avastin #14 today. Would also recommend Ozurdex #6 in 5 weeks since cornea is healing well. Will get prior auth from [address]

## 2021-04-28 NOTE — PATIENT DISCUSSION
Ozurdex #6 recommended today after discussion of benefits, risks and alternatives. The injection was given and tolerated well by patient. Post-injection instructions were reviewed and understood by the patient.

## 2021-04-28 NOTE — PROCEDURE NOTE: CLINICAL
PROCEDURE NOTE: Intravitreal Ozurdex #6 OD. Diagnosis: Central Retinal Vein Occlusion with Macular Edema. Anesthesia: Subconjunctival. Prep: Betadine Drops and Betadine Scrub. Prior to injection, risks/benefits/alternatives discussed including infection, loss of vision, hemorrhage, cataract, glaucoma, retinal tears or detachment and patient wished to proceed. After topical anesthesia, betadine prep was performed. After the Betadine prep, intravitreal injection of Ozurdex 0.7mg was given 3-4 mm from the limbus in the inferotemporal quadrant. A lid speculum was used. Intravitreal injection of Ozurdex 0.7mg was given. Injection site: 3-4 mm from the limbus. Lid speculum removed. Count fingers vision was verified. Patient tolerated procedure well. There were no complications. Count fingers or better vision was verified within 5 minutes of the intravitreal injection prior to the patient leaving the office. CRA perfusion confirmed. Post-op instructions given. Patient given office phone number/answering service number and advised to call immediately should there be an increase in floaters or redness, loss of vision or pain, or should they have any other questions or concerns. Milana Juarez

## 2021-04-28 NOTE — PATIENT DISCUSSION
No Neovascularization Present today. Refer to [address] [facility] for Glaucoma Consult for [facility] vs Tube.

## 2021-05-28 NOTE — PATIENT DISCUSSION
per [facility] TREATMENT [place] d/c Pred could be contributing to high IOP.

## 2021-05-28 NOTE — PATIENT DISCUSSION
Pt edu improved, treatment not indicated at this time. Will f/u in 3 months with possible Ozurdex treatment. Advised pt to call with any vision changes.

## 2021-05-28 NOTE — PATIENT DISCUSSION
Nevus looks stable, no change from photos and/or exam. Spoke with pt appt rescheduled to April 23rd

## 2021-05-28 NOTE — PATIENT DISCUSSION
No Neovascularization Present today. Refer to [address] [facility] for Glaucoma Consult for [address] vs Tube.

## 2021-08-27 NOTE — PATIENT DISCUSSION
No Neovascularization Present today. Refer to [address] [facility] for Glaucoma Consult for [address] vs Tube.

## 2021-08-27 NOTE — PROCEDURE NOTE: CLINICAL
PROCEDURE NOTE: Intravitreal Ozurdex #7 OD. Diagnosis: Central Retinal Vein Occlusion with Macular Edema. Anesthesia: Subconjunctival. Prep: Betadine Drops and Betadine Scrub. Prior to injection, risks/benefits/alternatives discussed including infection, loss of vision, hemorrhage, cataract, glaucoma, retinal tears or detachment and patient wished to proceed. After topical anesthesia, betadine prep was performed. After the Betadine prep, intravitreal injection of Ozurdex 0.7mg was given 3-4 mm from the limbus in the inferotemporal quadrant. A lid speculum was used. Intravitreal injection of Ozurdex 0.7mg was given. Injection site: 3-4 mm from the limbus. Lid speculum removed. Count fingers vision was verified. Patient tolerated procedure well. There were no complications. Count fingers or better vision was verified within 5 minutes of the intravitreal injection prior to the patient leaving the office. CRA perfusion confirmed. Post-op instructions given. Patient given office phone number/answering service number and advised to call immediately should there be an increase in floaters or redness, loss of vision or pain, or should they have any other questions or concerns. Milana Juarez

## 2021-10-08 NOTE — PATIENT DISCUSSION
per [facility] TREATMENT [place] d/c Pred could be contributing to high IOP.

## 2021-10-08 NOTE — PATIENT DISCUSSION
Based on today’s exam, diagnostic studies, and review of records, the determination was made for NO TREATMENT NEEDED TODAY. OBSERVATION recommended.

## 2021-10-08 NOTE — PATIENT DISCUSSION
No Neovascularization Present today. Refer to [address] [facility] for Glaucoma Consult for [address] vs Tube.

## 2021-10-15 ASSESSMENT — VISUAL ACUITY
OS_CC: 20/20 SN
OD_CC: 20/20 - SN
OS_CC: 20/20 SN
OS_CC: 20/25 -2 SN
OD_CC: 20/20 -2 SN
OD_CC: 20/20 SN

## 2021-10-15 ASSESSMENT — TONOMETRY
OS_IOP_MMHG: 19
OD_IOP_MMHG: 16
OD_IOP_MMHG: 19
OS_IOP_MMHG: 17

## 2021-12-22 NOTE — PATIENT DISCUSSION
Ozurdex #8 recommended today after discussion of benefits, risks and alternatives. The injection was given and tolerated well by patient. Post-injection instructions were reviewed and understood by the patient.

## 2021-12-22 NOTE — PATIENT DISCUSSION
No Neovascularization Present today. Refer to [address] [facility] for Glaucoma Consult for [address] vs Tube.

## 2021-12-22 NOTE — PROCEDURE NOTE: CLINICAL
PROCEDURE NOTE: Intravitreal Ozurdex #8 OD. Diagnosis: Central Retinal Vein Occlusion with Macular Edema. Anesthesia: Subconjunctival. Prep: Betadine Drops and Betadine Scrub. Prior to injection, risks/benefits/alternatives discussed including infection, loss of vision, hemorrhage, cataract, glaucoma, retinal tears or detachment and patient wished to proceed. After topical anesthesia, betadine prep was performed. After the Betadine prep, intravitreal injection of Ozurdex 0.7mg was given 3-4 mm from the limbus in the inferotemporal quadrant. A lid speculum was used. Intravitreal injection of Ozurdex 0.7mg was given. Injection site: 3-4 mm from the limbus. Lid speculum removed. Count fingers vision was verified. Patient tolerated procedure well. There were no complications. Count fingers or better vision was verified within 5 minutes of the intravitreal injection prior to the patient leaving the office. CRA perfusion confirmed. Post-op instructions given. Patient given office phone number/answering service number and advised to call immediately should there be an increase in floaters or redness, loss of vision or pain, or should they have any other questions or concerns. Agustina Harrison

## 2022-02-25 NOTE — PATIENT DISCUSSION
No changes seen on todays Exam and Oct remains stable. IOP remains stable D/C Brimonidine gtts. No tx needed today S/P Ozurdex #8 (12/22/21) Return in 2 months for OCT.

## 2022-02-25 NOTE — PATIENT DISCUSSION
No Neovascularization Present today. Refer to [facility] for Glaucoma Consult for [address] vs Tube.

## 2022-03-23 NOTE — PATIENT DISCUSSION
Retinal detachment warnings given. Consent: The patient's verbal consent was obtained including but not limited to risks of crusting, scabbing, blistering, scarring, darker or lighter pigmentary change, recurrence, incomplete removal and infection. Include Z78.9 (Other Specified Conditions Influencing Health Status) As An Associated Diagnosis?: No Spray Paint Text: The liquid nitrogen was applied to the skin utilizing a spray paint frosting technique. Detail Level: Zone Medical Necessity Information: It is in your best interest to select a reason for this procedure from the list below. All of these items fulfill various CMS LCD requirements except the new and changing color options. Show Spray Paint Technique Variable?: Yes Medical Necessity Clause: This procedure was medically necessary because the lesions that were treated were Number Of Freeze-Thaw Cycles: 2 freeze-thaw cycles Duration Of Freeze Thaw-Cycle (Seconds): 5 Post-Care Instructions: I reviewed with the patient in detail post-care instructions. Patient is to wear sunprotection, and avoid picking at any of the treated lesions. Pt may apply Vaseline to crusted or scabbing areas.

## 2022-05-26 NOTE — PATIENT DISCUSSION
The OCT/Exam indicates clinically significant macula edema.  Goal of tx edu, Vision has dropped since last Ozurdex 12/2021 and pt has risk of developing activity in opossite eye-full exam and testing completed today.

## 2022-05-26 NOTE — PATIENT DISCUSSION
An examination that was significantly and separately identifiable from the procedure performed today was completed for this nevus.

## 2022-05-26 NOTE — PATIENT DISCUSSION
per [facility] TREATMENT [place] d/c Pred could be contributing to high IOP.

## 2022-05-26 NOTE — PROCEDURE NOTE: CLINICAL
PROCEDURE NOTE: Intravitreal Ozurdex #9 OD. Diagnosis: Central Retinal Vein Occlusion with Macular Edema. Anesthesia: Subconjunctival. Prep: Betadine Drops and Betadine Scrub. Prior to injection, risks/benefits/alternatives discussed including infection, loss of vision, hemorrhage, cataract, glaucoma, retinal tears or detachment and patient wished to proceed. After topical anesthesia, betadine prep was performed. After the Betadine prep, intravitreal injection of Ozurdex 0.7mg was given 3-4 mm from the limbus in the inferotemporal quadrant. A lid speculum was used. Intravitreal injection of Ozurdex 0.7mg was given. Injection site: 3-4 mm from the limbus. Lid speculum removed. Count fingers vision was verified. Patient tolerated procedure well. There were no complications. Count fingers or better vision was verified within 5 minutes of the intravitreal injection prior to the patient leaving the office. CRA perfusion confirmed. Post-op instructions given. Patient given office phone number/answering service number and advised to call immediately should there be an increase in floaters or redness, loss of vision or pain, or should they have any other questions or concerns. Elo Vigil

## 2022-05-26 NOTE — PATIENT DISCUSSION
Vision has dropped OD x last Ozurdex 12/2021-Pt has multiple retinal/ocular comorbitities AND due to only one good seeing eye full exam/testing was completed today and the importance was discussed with pt in detail.

## 2022-05-26 NOTE — PATIENT DISCUSSION
Nevus looks stable, no change from photos and/or exam but stressed chance of malignant transformation so exam/testing is necessary as recommended. No orange pigment, no srf.

## 2022-05-26 NOTE — PATIENT DISCUSSION
Worse today, + NEW edema w/ few flame hem's.  Rec Ozurdex #9 today and post inj instructions edu.  keep head elevated x 24 hours.

## 2022-07-29 NOTE — PATIENT DISCUSSION
Recommended Lucentis . 5  injection today. The injection was given and tolerated well by patient. Post-injection instructions were reviewed and understood by the patient.

## 2022-07-29 NOTE — PATIENT DISCUSSION
An examination that was significantly and separately identifiable from the procedure performed today was also completed for POAG.

## 2022-07-29 NOTE — PROCEDURE NOTE: CLINICAL
PROCEDURE NOTE: Lucentis 0.5 mg OD. Diagnosis: Central Retinal Vein Occlusion with Macular Edema. Anesthesia: Topical. Prep: Betadine Drops and Scrubs. Prior to injection, risks/benefits/alternatives discussed including infection, loss of vision, hemorrhage, cataract, glaucoma, retinal tears or detachment and patient wished to proceed. Informed consent obtained. . Patient was advised the purpose of the treatment was to slow the progression of the disease, and may not improve visual acuity. Betadine prep was performed. Injection site: 3-4 mm from the limbus. Mask worn during procedure. A lid speculum was used. Intravitreal injection of Lucentis 0.5mg/0.05 ml was given. Discarded remaining *. CRA perfusion confirmed. The eye was irrigated with sterile eye rinse solution. The betadine was washed away. Count fingers vision was verified. The patient tolerated the procedure well and there were no complications from the procedure. Post procedure instructions given. Patient given office phone number/answering service number and advised to call immediately should there be an increase in floaters or redness, loss of vision or pain, or should they have any other questions or concerns. Patient was given the standard instruction sheet. Agustina Harrison

## 2022-09-23 NOTE — PROCEDURE NOTE: CLINICAL
PROCEDURE NOTE: Intravitreal Ozurdex OD. Diagnosis: Central Retinal Vein Occlusion with Macular Edema. Anesthesia: Subconjunctival. Prep: Betadine Drops and Betadine Scrub. Prior to injection, risks/benefits/alternatives discussed including infection, loss of vision, hemorrhage, cataract, glaucoma, retinal tears or detachment and patient wished to proceed. After topical anesthesia, betadine prep was performed. After the Betadine prep, intravitreal injection of Ozurdex 0.7mg was given 3-4 mm from the limbus in the inferotemporal quadrant. A lid speculum was used. Intravitreal injection of Ozurdex 0.7mg was given. Injection site: 3-4 mm from the limbus. Lid speculum removed. Count fingers vision was verified. Patient tolerated procedure well. There were no complications. Count fingers or better vision was verified within 5 minutes of the intravitreal injection prior to the patient leaving the office. CRA perfusion confirmed. Post-op instructions given. Patient given office phone number/answering service number and advised to call immediately should there be an increase in floaters or redness, loss of vision or pain, or should they have any other questions or concerns. SCL given.

## 2022-09-23 NOTE — PATIENT DISCUSSION
Recommended Ozurdex  injection today. The injection was given and tolerated well by patient. Post-injection instructions were reviewed and understood by the patient.

## 2022-09-23 NOTE — PATIENT DISCUSSION
per [facility] TREATMENT [place] d/c Pred could be contributing to high IOP.

## 2022-09-29 ENCOUNTER — ESTABLISHED PATIENT (OUTPATIENT)
Dept: URBAN - METROPOLITAN AREA CLINIC 12 | Facility: CLINIC | Age: 72
End: 2022-09-29

## 2022-09-29 DIAGNOSIS — H25.13: ICD-10-CM

## 2022-09-29 DIAGNOSIS — H35.3131: ICD-10-CM

## 2022-09-29 DIAGNOSIS — E11.9: ICD-10-CM

## 2022-09-29 PROCEDURE — 92004 COMPRE OPH EXAM NEW PT 1/>: CPT

## 2022-09-29 PROCEDURE — 92015 DETERMINE REFRACTIVE STATE: CPT

## 2022-09-29 ASSESSMENT — TONOMETRY
OS_IOP_MMHG: 12
OD_IOP_MMHG: 14

## 2022-09-29 ASSESSMENT — VISUAL ACUITY
OS_CC: 20/30
OD_CC: 20/30+2
OU_CC: J1

## 2022-10-10 NOTE — PATIENT DISCUSSION
per [facility] TREATMENT [place] d/c Pred could be contributing to high IOP.

## 2022-12-20 NOTE — PROCEDURE NOTE: CLINICAL
PROCEDURE NOTE: Eylea Prefilled Syringe 2mg OD. Diagnosis: Central Retinal Vein Occlusion with Macular Edema. Prep: Betadine Drops and Betadine Scrub. The patient was identified visually and verbally by the  surgeon. The procedure eye was marked with an adhesive arrow sticker. The  risks, benefits, alternatives and possible complications of the procedure  were discussed with the patient and informed consent was obtained. The  patient was positioned in the chair. Proparacaine, Betadine, Akten administered prior to injection. Additional Betadine was used. A speculum  was used to hold the eyelid open. A caliper was used to marked the site of  injection 3.5-4mm from the limbus. Betadine was placed on the site . A sterile 30 or 31 gauge needle with the  medication entered the vitreous cavity and the medication was  administered. The speculum was removed. The patient was instructed to call immediately if any  significant visual loss, swelling, discharge, or pain occurred Intravitreal injection. Patient tolerated the procedure well. There were no complications. CF vision checked. Post procedure instructions given. Luly Thompson

## 2022-12-20 NOTE — PATIENT DISCUSSION
per [facility] TREATMENT [place] d/c Pred could be contributing to high IOP.

## 2023-05-15 NOTE — PATIENT DISCUSSION
Doing well.   Good fetal movement.   Was hoping to get to today - son turned 4 yesterday.   Cephalic on US, precautions reviewed.   RTC weekly   one suture was removed from the right eye.

## 2023-07-26 NOTE — PATIENT DISCUSSION
Importance of monitoring for signs of growth or change in lesion characteristics stressed. Raquel left  for pt son to call back to reschedule appt.

## 2023-12-21 ENCOUNTER — ESTABLISHED PATIENT (OUTPATIENT)
Dept: URBAN - METROPOLITAN AREA CLINIC 12 | Facility: CLINIC | Age: 73
End: 2023-12-21

## 2023-12-21 DIAGNOSIS — H25.13: ICD-10-CM

## 2023-12-21 DIAGNOSIS — H35.3131: ICD-10-CM

## 2023-12-21 DIAGNOSIS — E11.9: ICD-10-CM

## 2023-12-21 PROCEDURE — 92015 DETERMINE REFRACTIVE STATE: CPT

## 2023-12-21 PROCEDURE — 92014 COMPRE OPH EXAM EST PT 1/>: CPT

## 2023-12-21 PROCEDURE — 92134 CPTRZ OPH DX IMG PST SGM RTA: CPT

## 2023-12-21 ASSESSMENT — TONOMETRY
OS_IOP_MMHG: 15
OD_IOP_MMHG: 13

## 2023-12-21 ASSESSMENT — VISUAL ACUITY
OS_CC: 20/30
OD_CC: 20/30
OS_GLARE: 20/50
OD_GLARE: 20/50

## 2024-10-30 NOTE — PATIENT DISCUSSION
Pt edu no swelling seen today on exam but difficult view due to #1. Ozurdex not recommended at this time. Recommend keeping next scheduled Ozurdex in June. oriented to person, place and time , normal sensation , short and long term memory intact

## 2025-06-17 ENCOUNTER — COMPREHENSIVE EXAM (OUTPATIENT)
Age: 75
End: 2025-06-17

## 2025-06-17 DIAGNOSIS — H52.4: ICD-10-CM

## 2025-06-17 DIAGNOSIS — H35.3131: ICD-10-CM

## 2025-06-17 PROCEDURE — 92015 DETERMINE REFRACTIVE STATE: CPT

## 2025-06-17 PROCEDURE — 92014 COMPRE OPH EXAM EST PT 1/>: CPT

## 2025-06-17 PROCEDURE — 92250 FUNDUS PHOTOGRAPHY W/I&R: CPT
